# Patient Record
Sex: MALE | Race: WHITE | URBAN - METROPOLITAN AREA
[De-identification: names, ages, dates, MRNs, and addresses within clinical notes are randomized per-mention and may not be internally consistent; named-entity substitution may affect disease eponyms.]

---

## 2021-05-28 ENCOUNTER — RECORDS - HEALTHEAST (OUTPATIENT)
Dept: ADMINISTRATIVE | Facility: CLINIC | Age: 76
End: 2021-05-28

## 2021-05-31 ENCOUNTER — RECORDS - HEALTHEAST (OUTPATIENT)
Dept: ADMINISTRATIVE | Facility: CLINIC | Age: 76
End: 2021-05-31

## 2025-07-07 ENCOUNTER — HOSPITAL ENCOUNTER (EMERGENCY)
Facility: HOSPITAL | Age: 80
Discharge: HOME | End: 2025-07-07
Attending: EMERGENCY MEDICINE
Payer: COMMERCIAL

## 2025-07-07 VITALS
TEMPERATURE: 98.3 F | WEIGHT: 160 LBS | RESPIRATION RATE: 18 BRPM | SYSTOLIC BLOOD PRESSURE: 161 MMHG | HEART RATE: 80 BPM | OXYGEN SATURATION: 97 % | DIASTOLIC BLOOD PRESSURE: 80 MMHG

## 2025-07-07 DIAGNOSIS — R53.83 FATIGUE, UNSPECIFIED TYPE: ICD-10-CM

## 2025-07-07 PROBLEM — F10.20 ALCOHOL USE DISORDER, SEVERE, DEPENDENCE (H): Status: ACTIVE | Noted: 2020-06-15

## 2025-07-07 LAB
ANION GAP SERPL CALCULATED.3IONS-SCNC: 9 MMOL/L (ref 7–15)
BASOPHILS # BLD AUTO: 0.1 10E3/UL (ref 0–0.2)
BASOPHILS NFR BLD AUTO: 1 %
BUN SERPL-MCNC: 13.7 MG/DL (ref 8–23)
CALCIUM SERPL-MCNC: 9.8 MG/DL (ref 8.8–10.4)
CHLORIDE SERPL-SCNC: 100 MMOL/L (ref 98–107)
CREAT SERPL-MCNC: 1.18 MG/DL (ref 0.67–1.17)
EGFRCR SERPLBLD CKD-EPI 2021: 63 ML/MIN/1.73M2
EOSINOPHIL # BLD AUTO: 0.6 10E3/UL (ref 0–0.7)
EOSINOPHIL NFR BLD AUTO: 5 %
ERYTHROCYTE [DISTWIDTH] IN BLOOD BY AUTOMATED COUNT: 14.8 % (ref 10–15)
ETHANOL SERPL-MCNC: <0.01 G/DL
GLUCOSE SERPL-MCNC: 79 MG/DL (ref 70–99)
HCO3 SERPL-SCNC: 22 MMOL/L (ref 22–29)
HCT VFR BLD AUTO: 44.1 % (ref 40–53)
HGB BLD-MCNC: 14.8 G/DL (ref 13.3–17.7)
IMM GRANULOCYTES # BLD: 0 10E3/UL
IMM GRANULOCYTES NFR BLD: 0 %
LYMPHOCYTES # BLD AUTO: 3.1 10E3/UL (ref 0.8–5.3)
LYMPHOCYTES NFR BLD AUTO: 27 %
MCH RBC QN AUTO: 28.3 PG (ref 26.5–33)
MCHC RBC AUTO-ENTMCNC: 33.6 G/DL (ref 31.5–36.5)
MCV RBC AUTO: 84 FL (ref 78–100)
MONOCYTES # BLD AUTO: 0.8 10E3/UL (ref 0–1.3)
MONOCYTES NFR BLD AUTO: 7 %
NEUTROPHILS # BLD AUTO: 6.9 10E3/UL (ref 1.6–8.3)
NEUTROPHILS NFR BLD AUTO: 60 %
NRBC # BLD AUTO: 0 10E3/UL
NRBC BLD AUTO-RTO: 0 /100
PLATELET # BLD AUTO: 389 10E3/UL (ref 150–450)
POTASSIUM SERPL-SCNC: 4.8 MMOL/L (ref 3.4–5.3)
RBC # BLD AUTO: 5.23 10E6/UL (ref 4.4–5.9)
SODIUM SERPL-SCNC: 131 MMOL/L (ref 135–145)
WBC # BLD AUTO: 11.5 10E3/UL (ref 4–11)

## 2025-07-07 PROCEDURE — 93005 ELECTROCARDIOGRAM TRACING: CPT | Performed by: EMERGENCY MEDICINE

## 2025-07-07 PROCEDURE — 250N000011 HC RX IP 250 OP 636: Performed by: EMERGENCY MEDICINE

## 2025-07-07 PROCEDURE — 96361 HYDRATE IV INFUSION ADD-ON: CPT

## 2025-07-07 PROCEDURE — 96374 THER/PROPH/DIAG INJ IV PUSH: CPT

## 2025-07-07 PROCEDURE — 82077 ASSAY SPEC XCP UR&BREATH IA: CPT | Performed by: EMERGENCY MEDICINE

## 2025-07-07 PROCEDURE — 99284 EMERGENCY DEPT VISIT MOD MDM: CPT | Mod: 25

## 2025-07-07 PROCEDURE — 258N000003 HC RX IP 258 OP 636: Performed by: EMERGENCY MEDICINE

## 2025-07-07 PROCEDURE — 85004 AUTOMATED DIFF WBC COUNT: CPT | Performed by: EMERGENCY MEDICINE

## 2025-07-07 PROCEDURE — 80048 BASIC METABOLIC PNL TOTAL CA: CPT | Performed by: EMERGENCY MEDICINE

## 2025-07-07 PROCEDURE — 36415 COLL VENOUS BLD VENIPUNCTURE: CPT | Performed by: EMERGENCY MEDICINE

## 2025-07-07 RX ORDER — ONDANSETRON 2 MG/ML
4 INJECTION INTRAMUSCULAR; INTRAVENOUS ONCE
Status: COMPLETED | OUTPATIENT
Start: 2025-07-07 | End: 2025-07-07

## 2025-07-07 RX ADMIN — ONDANSETRON 4 MG: 2 INJECTION, SOLUTION INTRAMUSCULAR; INTRAVENOUS at 16:57

## 2025-07-07 RX ADMIN — SODIUM CHLORIDE 1000 ML: 0.9 INJECTION, SOLUTION INTRAVENOUS at 16:55

## 2025-07-07 ASSESSMENT — ENCOUNTER SYMPTOMS
LIGHT-HEADEDNESS: 1
FEVER: 0
NAUSEA: 1

## 2025-07-07 ASSESSMENT — COLUMBIA-SUICIDE SEVERITY RATING SCALE - C-SSRS
6. HAVE YOU EVER DONE ANYTHING, STARTED TO DO ANYTHING, OR PREPARED TO DO ANYTHING TO END YOUR LIFE?: NO
1. IN THE PAST MONTH, HAVE YOU WISHED YOU WERE DEAD OR WISHED YOU COULD GO TO SLEEP AND NOT WAKE UP?: NO
2. HAVE YOU ACTUALLY HAD ANY THOUGHTS OF KILLING YOURSELF IN THE PAST MONTH?: NO

## 2025-07-07 NOTE — DISCHARGE INSTRUCTIONS
Encourage rest and fluids.  Follow-up with your clinic in the next week.  See them sooner if worse or problems or concerns.  If you need further help for evaluation, we are always open to help you.

## 2025-07-07 NOTE — ED NOTES
"Pt states to RN \"unhook me I'm leaving.\" MD notified and will come see pt in lobby. Pt maintains that he will be leaving AMA. Pt signed AMA.   "

## 2025-07-07 NOTE — ED PROVIDER NOTES
"EMERGENCY DEPARTMENT ENCOUNTER      NAME: Shawn Galarza  AGE: 80 year old male  YOB: 1945  MRN: [id number]  EVALUATION DATE & TIME: No admission date for patient encounter.    PCP: [facility]    ED PROVIDER: Taiwo Mason M.D.      Chief Complaint   Patient presents with    Fatigue    Nausea    Depression         FINAL IMPRESSION:  1.  General fatigue/weakness.  2.  Acute nausea.      ED COURSE & MEDICAL DECISION MAKING:    3:33 PM I met with the patient to gather history and to perform my initial exam. We discussed plans for the ED course, including diagnostic testing and treatment. PPE worn: cloth mask.  Patient presenting with a week of generalized weakness and fatigue, nausea longstanding history depression notes that he just \"feels bad and does not feel well\".  [facility] here for further evaluation.  6:09 PM.  EKG unremarkable.  CBC and chemistries unremarkable and borderline sodium at 131.  Blood alcohol 0.  Patient wants to leave at this time.  He does not want to wait for pending lab work or radiological studies.  He is fully aware that we of course will not know what the missing lab work or studies would have shown.  Patient signed out AGAINST MEDICAL ADVICE will follow-up with his doctor within the week.    Pertinent Labs & Imaging studies reviewed. (See chart for details)  80 year old male presents to the Emergency Department for evaluation of general weakness and fatigue with nausea.    At the conclusion of the encounter I discussed the results of all of the tests and the disposition. The questions were answered. The patient or family acknowledged understanding and was agreeable with the care plan.              Medical Decision Making  History reviewed with the patient.  Computer history for inpatient outpatient records reviewed.    Pending negative evaluation, anticipate discharge home.  If admitted will discuss with the " hospitalist service.    MIPS (CTPE, Dental pain, Leary, Sinusitis, Asthma/COPD, Head Trauma): Not Applicable    SEPSIS: None          MEDICATIONS GIVEN IN THE EMERGENCY:  Medications   sodium chloride 0.9% BOLUS 1,000 mL (has no administration in time range)   ondansetron (ZOFRAN) injection 4 mg (has no administration in time range)       NEW PRESCRIPTIONS STARTED AT TODAY'S ER VISIT  New Prescriptions    No medications on file          =================================================================    HPI    Patient information was obtained from: patient    Use of : N/A       Shawn Galarza is a 80 year old male with a pertinent history of depression, chronic alcoholism, chronic obstructive lung disease, GERD, hypercalcemia, HTN, and suicidal ideation, who presents to this ED by private car for evaluation of fatigue, nausea, and depression.    [facility] He reports not feeling well for a week. He notes dizziness, lightheadedness, and worsening nausea when standing. He denies syncopal episodes, and fevers. He does not identify any waxing or waning symptoms otherwise, exacerbating or alleviating features, associated symptoms except as mentioned. He denies any pain related complaints.    REVIEW OF SYSTEMS   Review of Systems   Constitutional:  Negative for fever.   Gastrointestinal:  Positive for nausea.   Neurological:  Positive for light-headedness. Negative for syncope.       PAST MEDICAL HISTORY:  History reviewed. No pertinent past medical history.    PAST SURGICAL HISTORY:  History reviewed. No pertinent surgical history.        CURRENT MEDICATIONS:    No current outpatient medications on file.      ALLERGIES:  No Known Allergies    FAMILY HISTORY:  History reviewed. No pertinent family history.    SOCIAL HISTORY:   Social History     Socioeconomic History    Marital status:      Spouse name: None    Number of children: None    Years of education:  None    Highest education level: None     Social Drivers of Health      Received from [employer]    Social Connections   Occasional tobacco.  No drugs.  History of alcoholism, now only occasional.    VITALS:  BP (!) 161/80   Pulse 80   Temp 98.3  F (36.8  C) (Temporal)   Resp 18   Wt 72.6 kg (160 lb)   SpO2 97%     PHYSICAL EXAM    Vital Signs:  BP (!) 161/80   Pulse 80   Temp 98.3  F (36.8  C) (Temporal)   Resp 18   Wt 72.6 kg (160 lb)   SpO2 97%   General:  On entering the room he is in no apparent distress.    Neck:  Neck supple with full range of motion and nontender.    Back:  Back and spine are nontender.  No costovertebral angle tenderness.    HEENT:  Oropharynx clear with moist mucous membranes.  HEENT unremarkable.    Pulmonary:  Chest clear to auscultation without rhonchi rales or wheezing.    Cardiovascular:  Cardiac regular rate and rhythm without murmurs rubs or gallops.    Abdomen:  Abdomen soft nontender.  There is no rebound or guarding.    Muskuloskeletal:  He moves all 4 without any difficulty and has normal neurovascular exams.  Extremities without clubbing, cyanosis, or edema.  Legs and calves are nontender.    Neuro:  He is alert and oriented ×3 and moves all extremities symmetrically.    Psych:  Normal affect.    Skin:  Unremarkable and warm and dry.       LAB:  All pertinent labs reviewed and interpreted.  Labs Ordered and Resulted from Time of ED Arrival to Time of ED Departure   BASIC METABOLIC PANEL - Abnormal       Result Value    Sodium 131 (*)     Potassium 4.8      Chloride 100      Carbon Dioxide (CO2) 22      Anion Gap 9      Urea Nitrogen 13.7      Creatinine 1.18 (*)     GFR Estimate 63      Calcium 9.8      Glucose 79     CBC WITH PLATELETS AND DIFFERENTIAL - Abnormal    WBC Count 11.5 (*)     RBC Count 5.23      Hemoglobin 14.8      Hematocrit 44.1      MCV 84      MCH 28.3      MCHC 33.6      RDW 14.8      Platelet Count 389      %  Neutrophils 60      % Lymphocytes 27      % Monocytes 7      % Eosinophils 5      % Basophils 1      % Immature Granulocytes 0      NRBCs per 100 WBC 0      Absolute Neutrophils 6.9      Absolute Lymphocytes 3.1      Absolute Monocytes 0.8      Absolute Eosinophils 0.6      Absolute Basophils 0.1      Absolute Immature Granulocytes 0.0      Absolute NRBCs 0.0     ETHANOL LEVEL BLOOD - Normal    Ethanol Level Blood <0.01     ROUTINE UA WITH MICROSCOPIC REFLEX TO CULTURE   TROPONIN T, HIGH SENSITIVITY       RADIOLOGY:  Reviewed all pertinent imaging. Please see official radiology report.  Head CT w/o contrast    (Results Pending)   XR Chest Port 1 View    (Results Pending)              EKG:    Normal sinus rhythm at 66, negative EKG.  Same as previous EKG of November 2007.    I have independently reviewed and interpreted the EKG(s) documented above.    PROCEDURES:   None.      I, Mel Marks, am serving as a scribe to document services personally performed by Dr. Mason based on my observation and the provider's statements to me. I, Taiwo Mason MD attest that Mel Marks is acting in a scribe capacity, has observed my performance of the services and has documented them in accordance with my direction.    Taiwo Mason M.D.  Emergency Medicine  [facility] EMERGENCY DEPARTMENT  [facility] [facility] [id number] [phone]  Dept: [phone]       Taiwo Mason MD  07/07/25 3695

## 2025-07-07 NOTE — ED TRIAGE NOTES
"Pt to ED c/o fatigue, nausea and feeling depressed. Pt states one week ago he \"drank the better part of a tequila bottle.\" He states since than he has been feeling \"bad.\" Pt is reluctant to describe how he is feeling during triage and frequently uses \"bad\" and \"I just don't feel good.\" He states he has nausea but no vomiting. States he has been eating and drinking like normal. [facility] and told [facility] about his symptoms. They told him to go to the ER  Pt states Hx of alcoholism but now only drinks occasionally. Pt also reporting since drinking he is feeling more depressed. He denies SI/HI.     Triage Assessment (Adult)       Row Name 07/07/25 1419          Triage Assessment    Airway WDL WDL        Respiratory WDL    Respiratory WDL WDL        Skin Circulation/Temperature WDL    Skin Circulation/Temperature WDL WDL        Cardiac WDL    Cardiac WDL WDL        Peripheral/Neurovascular WDL    Peripheral Neurovascular WDL WDL        Cognitive/Neuro/Behavioral WDL    Cognitive/Neuro/Behavioral WDL WDL                     "